# Patient Record
Sex: MALE | Race: WHITE
[De-identification: names, ages, dates, MRNs, and addresses within clinical notes are randomized per-mention and may not be internally consistent; named-entity substitution may affect disease eponyms.]

---

## 2018-01-10 ENCOUNTER — HOSPITAL ENCOUNTER (EMERGENCY)
Dept: HOSPITAL 17 - NEPC | Age: 78
LOS: 1 days | Discharge: HOME | End: 2018-01-11
Payer: MEDICARE

## 2018-01-10 VITALS
RESPIRATION RATE: 18 BRPM | DIASTOLIC BLOOD PRESSURE: 61 MMHG | SYSTOLIC BLOOD PRESSURE: 129 MMHG | TEMPERATURE: 97.7 F | HEART RATE: 88 BPM | OXYGEN SATURATION: 95 %

## 2018-01-10 VITALS — HEIGHT: 65 IN | WEIGHT: 145.51 LBS | BODY MASS INDEX: 24.24 KG/M2

## 2018-01-10 DIAGNOSIS — E27.2: ICD-10-CM

## 2018-01-10 DIAGNOSIS — E87.1: Primary | ICD-10-CM

## 2018-01-10 DIAGNOSIS — J44.9: ICD-10-CM

## 2018-01-10 LAB
ALBUMIN SERPL-MCNC: 3.7 GM/DL (ref 3.4–5)
ALP SERPL-CCNC: 83 U/L (ref 45–117)
ALT SERPL-CCNC: 39 U/L (ref 12–78)
AST SERPL-CCNC: 41 U/L (ref 15–37)
BASOPHILS # BLD AUTO: 0 TH/MM3 (ref 0–0.2)
BASOPHILS NFR BLD: 0.5 % (ref 0–2)
BILIRUB SERPL-MCNC: 0.3 MG/DL (ref 0.2–1)
BUN SERPL-MCNC: 21 MG/DL (ref 7–18)
CALCIUM SERPL-MCNC: 8.6 MG/DL (ref 8.5–10.1)
CHLORIDE SERPL-SCNC: 97 MEQ/L (ref 98–107)
CREAT SERPL-MCNC: 1.3 MG/DL (ref 0.6–1.3)
EOSINOPHIL # BLD: 0 TH/MM3 (ref 0–0.4)
EOSINOPHIL NFR BLD: 0.2 % (ref 0–4)
ERYTHROCYTE [DISTWIDTH] IN BLOOD BY AUTOMATED COUNT: 13.6 % (ref 11.6–17.2)
GFR SERPLBLD BASED ON 1.73 SQ M-ARVRAT: 54 ML/MIN (ref 89–?)
GLUCOSE SERPL-MCNC: 104 MG/DL (ref 74–106)
HCO3 BLD-SCNC: 27.4 MEQ/L (ref 21–32)
HCT VFR BLD CALC: 39.2 % (ref 39–51)
HGB BLD-MCNC: 14 GM/DL (ref 13–17)
LYMPHOCYTES # BLD AUTO: 1.1 TH/MM3 (ref 1–4.8)
LYMPHOCYTES NFR BLD AUTO: 18.2 % (ref 9–44)
MCH RBC QN AUTO: 32.6 PG (ref 27–34)
MCHC RBC AUTO-ENTMCNC: 35.8 % (ref 32–36)
MCV RBC AUTO: 91 FL (ref 80–100)
MONOCYTE #: 1 TH/MM3 (ref 0–0.9)
MONOCYTES NFR BLD: 16.2 % (ref 0–8)
NEUTROPHILS # BLD AUTO: 4 TH/MM3 (ref 1.8–7.7)
NEUTROPHILS NFR BLD AUTO: 64.9 % (ref 16–70)
PLATELET # BLD: 295 TH/MM3 (ref 150–450)
PMV BLD AUTO: 7.7 FL (ref 7–11)
PROT SERPL-MCNC: 7.7 GM/DL (ref 6.4–8.2)
RBC # BLD AUTO: 4.31 MIL/MM3 (ref 4.5–5.9)
SODIUM SERPL-SCNC: 132 MEQ/L (ref 136–145)
WBC # BLD AUTO: 6.2 TH/MM3 (ref 4–11)

## 2018-01-10 PROCEDURE — 85025 COMPLETE CBC W/AUTO DIFF WBC: CPT

## 2018-01-10 PROCEDURE — 80053 COMPREHEN METABOLIC PANEL: CPT

## 2018-01-10 PROCEDURE — 99285 EMERGENCY DEPT VISIT HI MDM: CPT

## 2018-01-10 PROCEDURE — 83735 ASSAY OF MAGNESIUM: CPT

## 2018-01-10 PROCEDURE — 94640 AIRWAY INHALATION TREATMENT: CPT

## 2018-01-10 PROCEDURE — 85379 FIBRIN DEGRADATION QUANT: CPT

## 2018-01-10 PROCEDURE — 96374 THER/PROPH/DIAG INJ IV PUSH: CPT

## 2018-01-10 PROCEDURE — 71046 X-RAY EXAM CHEST 2 VIEWS: CPT

## 2018-01-10 PROCEDURE — 96361 HYDRATE IV INFUSION ADD-ON: CPT

## 2018-01-10 PROCEDURE — 80048 BASIC METABOLIC PNL TOTAL CA: CPT

## 2018-01-10 PROCEDURE — 94664 DEMO&/EVAL PT USE INHALER: CPT

## 2018-01-10 PROCEDURE — 83605 ASSAY OF LACTIC ACID: CPT

## 2018-01-10 NOTE — PD
HPI


.


Respiratory symptoms


Chief Complaint:  Respiratory Symptoms


Time Seen by Provider:  21:37


Travel History


International Travel<30 days:  No


Contact w/Intl Traveler<30days:  No


Traveled to known affect area:  No





History of Present Illness


HPI


Patient brought to room at 2137





77-year-old male complains of having generalized weakness and malaise not 

feeling well today, notes respiratory distress, however is not complaining of 

any wheezing coughing air hunger.  Patient notes having these symptoms since a 

bout of diarrhea that he had approximately 2 weeks ago that lasted for several 

days which patient states he became very dehydrated and hasn't rebounded well 

since.  Patient takes prednisone 5 mg daily for COPD, and missed some doses 

perhaps he thinks.  Denies fever chills sweats, headache visual changes neck 

pain stiffness, chest pain, abdominal pain, nausea vomiting, diarrhea has 

subsided from his episode 2 weeks ago, no leg swelling or pain, no foreign 

travel or sedentary period.





PF[facility]


Past Medical History


*** Narrative Medical


Past medical history reviewed





Social History


Alcohol Use:  No


Tobacco Use:  No


Substance Use:  No





Allergies-Medications


(Allergen,Severity, Reaction):  


Coded Allergies:  


     Sulfa (Sulfonamide Antibiotics) (Verified  Allergy, Unknown, 1/10/18)


Narrative Medication


Allergies and medications reviewed





Review of Systems


Except as stated in HPI:  all other systems reviewed are Neg


General / Constitutional:  No: Fever


Eyes:  No: Visual changes


HENT:  No: Headaches


Cardiovascular:  No: Chest Pain or Discomfort


Respiratory:  No: Shortness of Breath


Gastrointestinal:  No: Abdominal Pain


Genitourinary:  No: Dysuria


Musculoskeletal:  No: Pain


Skin:  No Rash


Neurologic:  No: Weakness


Psychiatric:  No: Depression


Endocrine:  No: Polydipsia


Hematologic/Lymphatic:  No: Easy Bruising





Physical Exam


Narrative


GENERAL: Awake alert oriented 3 no acute distress


SKIN: Warm and dry.  Color is slightly sallow no cyanosis diaphoresis or pallor


HEAD: Atraumatic. Normocephalic. 


EYES: Pupils equal and round. No scleral icterus. No injection or drainage. 


ENT: No nasal bleeding or discharge.  Mucous membranes pink and moist.


NECK: Trachea midline. No JVD.  Supple full range of motion


CARDIOVASCULAR: Regular rate and rhythm.  S1-S2 no murmurs or gallops


RESPIRATORY: No accessory muscle use. Clear to auscultation. Breath sounds 

equal bilaterally. 


GASTROINTESTINAL: Abdomen soft, non-tender, nondistended. Hepatic and splenic 

margins not palpable. 


MUSCULOSKELETAL: Extremities without clubbing, cyanosis, or edema. No obvious 

deformities. 


NEUROLOGICAL: Awake and alert. No obvious cranial nerve deficits.  Motor 

grossly within normal limits. Five out of 5 muscle strength in the arms and 

legs.  Normal speech.


PSYCHIATRIC: Appropriate mood and affect; insight and judgment normal.





Data


Data


Last Documented VS





Vital Signs








  Date Time  Temp Pulse Resp B/P (MAP) Pulse Ox O2 Delivery O2 Flow Rate FiO2


 


1/10/18 17:04 97.7 88 18 129/61 (83) 95 Room Air  








Orders





 Orders


Complete Blood Count With Diff (1/10/18 17:42)


Comprehensive Metabolic Panel (1/10/18 17:42)


Lactic Acid Sepsis Protocol (1/10/18 17:42)


Chest, Pa & Lat (1/10/18 17:42)


Magnesium (Mg) (1/10/18 21:39)


Sodium Chlor 0.9% 250 Ml Inj (Ns 250 Ml (1/10/18 21:45)


Potassium Chloride (Kcl) (1/10/18 21:45)


Methylprednisolone So Succ Inj (Solumedr (1/10/18 21:45)


Sodium Chlor 0.9% 1000 Ml Inj (Ns 1000 M (1/10/18 23:45)


Albuterol-Ipratropium Neb (Duoneb Neb) (1/10/18 23:45)


D-Dimer (1/10/18 23:43)


Basic Metabolic Panel (Bmp) (1/11/18 02:00)





Labs





Laboratory Tests








Test


  1/10/18


19:40 1/10/18


21:50 1/11/18


00:15 1/11/18


02:05


 


White Blood Count 6.2 TH/MM3    


 


Red Blood Count 4.31 MIL/MM3    


 


Hemoglobin 14.0 GM/DL    


 


Hematocrit 39.2 %    


 


Mean Corpuscular Volume 91.0 FL    


 


Mean Corpuscular Hemoglobin 32.6 PG    


 


Mean Corpuscular Hemoglobin


Concent 35.8 % 


  


  


  


 


 


Red Cell Distribution Width 13.6 %    


 


Platelet Count 295 TH/MM3    


 


Mean Platelet Volume 7.7 FL    


 


Neutrophils (%) (Auto) 64.9 %    


 


Lymphocytes (%) (Auto) 18.2 %    


 


Monocytes (%) (Auto) 16.2 %    


 


Eosinophils (%) (Auto) 0.2 %    


 


Basophils (%) (Auto) 0.5 %    


 


Neutrophils # (Auto) 4.0 TH/MM3    


 


Lymphocytes # (Auto) 1.1 TH/MM3    


 


Monocytes # (Auto) 1.0 TH/MM3    


 


Eosinophils # (Auto) 0.0 TH/MM3    


 


Basophils # (Auto) 0.0 TH/MM3    


 


CBC Comment DIFF FINAL    


 


Differential Comment     


 


Blood Urea Nitrogen 21 MG/DL    18 MG/DL 


 


Creatinine 1.30 MG/DL    0.99 MG/DL 


 


Random Glucose 104 MG/DL    146 MG/DL 


 


Total Protein 7.7 GM/DL    


 


Albumin 3.7 GM/DL    


 


Calcium Level 8.6 MG/DL    7.6 MG/DL 


 


Alkaline Phosphatase 83 U/L    


 


Aspartate Amino Transf


(AST/SGOT) 41 U/L 


  


  


  


 


 


Alanine Aminotransferase


(ALT/SGPT) 39 U/L 


  


  


  


 


 


Total Bilirubin 0.3 MG/DL    


 


Sodium Level 132 MEQ/L    137 MEQ/L 


 


Potassium Level 3.1 MEQ/L    3.8 MEQ/L 


 


Chloride Level 97 MEQ/L    105 MEQ/L 


 


Carbon Dioxide Level 27.4 MEQ/L    22.7 MEQ/L 


 


Anion Gap 8 MEQ/L    9 MEQ/L 


 


Estimat Glomerular Filtration


Rate 54 ML/MIN 


  


  


  73 ML/MIN 


 


 


Lactic Acid Level 1.3 mmol/L    


 


Magnesium Level  2.2 MG/DL   


 


D-Dimer Quantitative (PE/DVT)   0.48 MG/L FEU  











[facility]


Medical Decision Making


Medical Screen Exam Complete:  Yes


Emergency Medical Condition:  Yes


Medical Record Reviewed:  Yes


Differential Diagnosis


Generalized weakness, addisonian crisis, respiratory distress, pulmonary embolus


Narrative Course


EKG normal sinus rhythm at 72 bpm nonischemic


Chest x-ray negative


Laboratory examinations reviewed,, patient is severely hyponatremic, 

hypochloremic.  Consistent with patient's history of diarrhea as well as his 

history of chronically using steroids and potentially not being compliant with 

same recently, creating a partial addisonian presentation.  Patient treated 

with IV normal saline, IV steroids, patient felt greatly improved.  Patient 

expressed a return of his appetite, was given a full meal with extra salt which 

patient enjoyed.  Repeat laboratory examinations revealed correction of patient'

s sodium and chloride.  Patient's complaint of generalized malaise and 

shortness of breath abated.  Discharge





Diagnosis





 Primary Impression:  


 Hyponatremia


 Additional Impression:  


 Addisonian crisis


Patient Instructions:  General Instructions, Hyponatremia (ED), Weakness (ED)





***Additional Instructions:  


Stay well-hydrated, use sports drinks, not free water.  Maintain your steroid 

use, very important exhalation point follow-up with her doctor.  Return for 

worsening


Disposition:  01 DISCHARGE HOME


Condition:  Stable











Myles Cunha MD Pra[place] 10, 2018 21:37

## 2018-01-10 NOTE — RADRPT
EXAM DATE/TIME:  01/10/2018 17:54 

 

[facility] COMPARISON:     

No previous studies available for comparison.

 

                     

INDICATIONS :     

Short of breath.

                     

 

MEDICAL HISTORY :     

Chronic obstructive pulmonary disease.          

 

SURGICAL HISTORY :     

None.   

 

ENCOUNTER:     

Initial                                        

 

ACUITY:     

1 week      

 

PAIN SCORE:     

0/10

 

LOCATION:     

Bilateral chest 

 

FINDINGS:     

PA and lateral views of the chest demonstrate the lungs to be symmetrically aerated without evidence 
of mass, infiltrate or effusion.  The cardiomediastinal contours are unremarkable.  Osseous structure
s are intact.

 

CONCLUSION:     Normal examination.  

 

 

 

 Selvin Engel MD on January 10, 2018 at 18:04           

Board Certified Radiologist.

 This report was verified electronically.

## 2018-01-11 VITALS
HEART RATE: 80 BPM | RESPIRATION RATE: 18 BRPM | OXYGEN SATURATION: 95 % | TEMPERATURE: 98 F | SYSTOLIC BLOOD PRESSURE: 125 MMHG | DIASTOLIC BLOOD PRESSURE: 62 MMHG

## 2018-01-11 VITALS — DIASTOLIC BLOOD PRESSURE: 70 MMHG | TEMPERATURE: 98 F | SYSTOLIC BLOOD PRESSURE: 132 MMHG

## 2018-01-11 LAB
BUN SERPL-MCNC: 18 MG/DL (ref 7–18)
CALCIUM SERPL-MCNC: 7.6 MG/DL (ref 8.5–10.1)
CHLORIDE SERPL-SCNC: 105 MEQ/L (ref 98–107)
CREAT SERPL-MCNC: 0.99 MG/DL (ref 0.6–1.3)
GFR SERPLBLD BASED ON 1.73 SQ M-ARVRAT: 73 ML/MIN (ref 89–?)
GLUCOSE SERPL-MCNC: 146 MG/DL (ref 74–106)
HCO3 BLD-SCNC: 22.7 MEQ/L (ref 21–32)
SODIUM SERPL-SCNC: 137 MEQ/L (ref 136–145)